# Patient Record
Sex: FEMALE | Race: WHITE
[De-identification: names, ages, dates, MRNs, and addresses within clinical notes are randomized per-mention and may not be internally consistent; named-entity substitution may affect disease eponyms.]

---

## 2017-03-22 ENCOUNTER — HOSPITAL ENCOUNTER (EMERGENCY)
Dept: HOSPITAL 26 - MED | Age: 47
Discharge: LEFT BEFORE DISCHARGE | End: 2017-03-22
Payer: MEDICAID

## 2017-03-22 ENCOUNTER — HOSPITAL ENCOUNTER (EMERGENCY)
Dept: HOSPITAL 1 - ED | Age: 47
Discharge: LEFT BEFORE DISCHARGE | End: 2017-03-22
Payer: MEDICAID

## 2017-03-22 VITALS — SYSTOLIC BLOOD PRESSURE: 139 MMHG | DIASTOLIC BLOOD PRESSURE: 87 MMHG

## 2017-03-22 VITALS — BODY MASS INDEX: 29.45 KG/M2 | WEIGHT: 150 LBS | HEIGHT: 60 IN

## 2017-03-22 VITALS — SYSTOLIC BLOOD PRESSURE: 154 MMHG | DIASTOLIC BLOOD PRESSURE: 100 MMHG

## 2017-03-22 DIAGNOSIS — M54.2: ICD-10-CM

## 2017-03-22 DIAGNOSIS — R10.9: Primary | ICD-10-CM

## 2017-03-22 DIAGNOSIS — Z53.21: Primary | ICD-10-CM

## 2017-03-22 DIAGNOSIS — Z53.21: ICD-10-CM

## 2017-03-22 NOTE — NUR
PATIENT PRESENTS TO ED WITH C/O RIGHT FLANK PAIN . PT STATES THE PAIN STARTED 2 
DAYS AGO AND HAS GOTTEN PROGRESSIVELY WORSE. HX ARTHRITIS . DENIES DIARRHEA; 
SKIN IS PINK/WARM/DRY; AAOX4 WITH EVEN AND STEADY GAIT; LUNGS CLEAR BL; HR EVEN 
AND REGULAR; PT DENIES ANY FEVER, CP, SOB, OR COUGH AT THIS TIME; PATIENT 
STATES PAIN OF 10/10 AT THIS TIME; VSS; PATIENT POSITIONED FOR COMFORT; HOB 
ELEVATED; BEDRAILS UP X2; BED DOWN. ER MD MADE AWARE OF PT STATUS.